# Patient Record
Sex: MALE | Race: OTHER | Employment: FULL TIME | URBAN - METROPOLITAN AREA
[De-identification: names, ages, dates, MRNs, and addresses within clinical notes are randomized per-mention and may not be internally consistent; named-entity substitution may affect disease eponyms.]

---

## 2022-04-27 ENCOUNTER — HOSPITAL ENCOUNTER (EMERGENCY)
Age: 22
Discharge: HOME | End: 2022-04-27
Attending: EMERGENCY MEDICINE

## 2022-04-27 VITALS
DIASTOLIC BLOOD PRESSURE: 78 MMHG | OXYGEN SATURATION: 98 % | HEIGHT: 63 IN | RESPIRATION RATE: 18 BRPM | TEMPERATURE: 97.9 F | HEART RATE: 66 BPM | SYSTOLIC BLOOD PRESSURE: 116 MMHG | BODY MASS INDEX: 23.92 KG/M2 | WEIGHT: 135 LBS

## 2022-04-27 DIAGNOSIS — H10.31 ACUTE CONJUNCTIVITIS OF RIGHT EYE, UNSPECIFIED ACUTE CONJUNCTIVITIS TYPE: ICD-10-CM

## 2022-04-27 DIAGNOSIS — S00.01XA ABRASION OF SCALP, INITIAL ENCOUNTER: Primary | ICD-10-CM

## 2022-04-27 PROCEDURE — 90715 TDAP VACCINE 7 YRS/> IM: CPT | Performed by: EMERGENCY MEDICINE

## 2022-04-27 PROCEDURE — 99284 EMERGENCY DEPT VISIT MOD MDM: CPT

## 2022-04-27 PROCEDURE — 90471 IMMUNIZATION ADMIN: CPT

## 2022-04-27 PROCEDURE — 74011250636 HC RX REV CODE- 250/636: Performed by: EMERGENCY MEDICINE

## 2022-04-27 RX ORDER — GENTAMICIN SULFATE 3 MG/ML
1 SOLUTION/ DROPS OPHTHALMIC 4 TIMES DAILY
Qty: 1 EACH | Refills: 0 | Status: SHIPPED | OUTPATIENT
Start: 2022-04-27 | End: 2022-05-02

## 2022-04-27 RX ADMIN — TETANUS TOXOID, REDUCED DIPHTHERIA TOXOID AND ACELLULAR PERTUSSIS VACCINE, ADSORBED 0.5 ML: 5; 2.5; 8; 8; 2.5 SUSPENSION INTRAMUSCULAR at 16:27

## 2022-04-27 NOTE — ED NOTES
I have reviewed discharge instructions with the patient. The patient verbalized understanding. Patient left ED via Discharge Method: ambulatory to Home with friend. Opportunity for questions and clarification provided. Patient given 1 scripts. To continue your aftercare when you leave the hospital, you may receive an automated call from our care team to check in on how you are doing. This is a free service and part of our promise to provide the best care and service to meet your aftercare needs.  If you have questions, or wish to unsubscribe from this service please call [phone]. Thank you for Choosing our [facility] Emergency Department.

## 2022-04-27 NOTE — ED TRIAGE NOTES
Pt states he was at work and lifted head up, scraping top of head on bottom of nail. Unsure of last tetanus shot.  needed, pt speaks Panamanian.

## 2022-04-27 NOTE — DISCHARGE INSTRUCTIONS
Apply antibiotic ointment to the wound 3 times a day  Wash your hair gently with mild soap and warm water  Do not apply alcohol or hydrogen peroxide to the cut    Use eyedrops as prescribed    Follow-up with ophthalmology  Follow-up with primary care    Return to ER for any worsening symptoms or new problems which may arise      Aplicar pomada antibiótica en la herida 3 veces al día. Lávese el joan suavemente con Ellouise Blade y agua tibia. No aplique alcohol o peróxido de hidrógeno en el naveed.     Use gotas para los ojos según lo prescrito  ______________________________________________________  [employer] con oftalmología  Seguimiento con atención primaria    Regrese a la emily de emergencias por cualquier empeoramiento de los síntomas o nuevos problemas que puedan surgir

## 2022-04-27 NOTE — ED PROVIDER NOTES
22-year-old male patient presents with a wound to his scalp. States that he stood up while painting and scratched the back of his head against an exposed nail  No other injuries  He has not been dizzy or lightheaded. He had no nausea vomiting or diarrhea    The history is provided by the patient. The history is limited by a language barrier. A  was used. Laceration   The incident occurred 1 to 2 hours ago. The laceration is located on the scalp. The laceration is 3 cm in size. The injury mechanism is a metal edge. Foreign body present: unknown. Possible foreign bodies present include a nail. The pain is mild. The pain has been constant since onset. Pertinent negatives include no numbness, no tingling and no weakness. It is unknown when the patient last had a tetanus shot. History reviewed. No pertinent past medical history. No past surgical history on file. History reviewed. No pertinent family history. Social History     Socioeconomic History    Marital status: Not on file     Spouse name: Not on file    Number of children: Not on file    Years of education: Not on file    Highest education level: Not on file   Occupational History    Not on file   Tobacco Use    Smoking status: Not on file    Smokeless tobacco: Not on file   Substance and Sexual Activity    Alcohol use: Not on file    Drug use: Not on file    Sexual activity: Not on file   Other Topics Concern    Not on file   Social History Narrative    Not on file     Social Determinants of Health     Financial Resource Strain:     Difficulty of Paying Living Expenses: Not on file   Food Insecurity:     Worried About Running Out of Food in the Last Year: Not on file    Nancy of Food in the Last Year: Not on file   Transportation Needs:     Lack of Transportation (Medical): Not on file    Lack of Transportation (Non-Medical):  Not on file   Physical Activity:     Days of Exercise per Week: Not on file  Minutes of Exercise per Session: Not on file   Stress:     Feeling of Stress : Not on file   Social Connections:     Frequency of Communication with Friends and Family: Not on file    Frequency of Social Gatherings with Friends and Family: Not on file    Attends Mu-ism Services: Not on file    Active Member of Clubs or Organizations: Not on file    Attends Club or Organization Meetings: Not on file    Marital Status: Not on file   Intimate Partner Violence:     Fear of Current or Ex-Partner: Not on file    Emotionally Abused: Not on file    Physically Abused: Not on file    Sexually Abused: Not on file   Housing Stability:     Unable to Pay for Housing in the Last Year: Not on file    Number of [place] in the Last Year: Not on file    Unstable Housing in the Last Year: Not on file         ALLERGIES: Patient has no known allergies. Review of Systems   Constitutional: Negative for activity change, chills, diaphoresis and fever. HENT: Negative for dental problem, hearing loss, nosebleeds, rhinorrhea and sore throat. Eyes: Positive for discharge and redness. Negative for pain and visual disturbance. Patient reports that he has had a problem with his right eye \"since birth. \"  States that when he gets hot it becomes red and tears  He is never been evaluated by medical provider for this complaint   Respiratory: Negative for cough, chest tightness and shortness of breath. Cardiovascular: Negative for chest pain, palpitations and leg swelling. Gastrointestinal: Negative for abdominal pain, constipation, diarrhea, nausea and vomiting. Endocrine: Negative for cold intolerance, heat intolerance, polydipsia and polyuria. Genitourinary: Negative for dysuria and flank pain. Musculoskeletal: Negative for arthralgias, back pain, joint swelling, myalgias and neck pain. Skin: Positive for wound. Negative for pallor and rash.    Allergic/Immunologic: Negative for environmental allergies and food allergies. Neurological: Negative for dizziness, tingling, tremors, weakness, light-headedness, numbness and headaches. Hematological: Negative for adenopathy. Does not bruise/bleed easily. Psychiatric/Behavioral: Negative for confusion and dysphoric mood. The patient is not nervous/anxious and is not hyperactive. All other systems reviewed and are negative. Vitals:    04/27/22 1619   BP: 116/78   Pulse: 66   Resp: 18   Temp: 97.9 °F (36.6 °C)   SpO2: 98%   Weight: 61.2 kg (135 lb)   Height: 5' 3\" (1.6 m)            Physical Exam  Vitals and nursing note reviewed. Constitutional:       General: He is in acute distress. Appearance: Normal appearance. He is well-developed and normal weight. HENT:      Head: Normocephalic. Right Ear: External ear normal.      Left Ear: External ear normal.      Mouth/Throat:      Mouth: Mucous membranes are moist.      Pharynx: Oropharynx is clear. No oropharyngeal exudate. Eyes:      General: Lids are normal. Vision grossly intact. Gaze aligned appropriately. No visual field deficit or scleral icterus. Extraocular Movements: Extraocular movements intact. Conjunctiva/sclera:      Right eye: Right conjunctiva is injected. No chemosis, exudate or hemorrhage. Pupils: Pupils are equal, round, and reactive to light. Neck:      Thyroid: No thyromegaly. Vascular: No JVD. Cardiovascular:      Rate and Rhythm: Normal rate and regular rhythm. Pulses: Normal pulses. Heart sounds: Normal heart sounds. No murmur heard. No friction rub. No gallop. Pulmonary:      Effort: Pulmonary effort is normal. No respiratory distress. Breath sounds: Normal breath sounds. No wheezing. Musculoskeletal:         General: No tenderness or deformity. Normal range of motion. Cervical back: Normal range of motion and neck supple. Skin:     General: Skin is warm and dry.       Capillary Refill: Capillary refill takes less than 2 seconds. Findings: No rash. Neurological:      General: No focal deficit present. Mental Status: He is alert and oriented to person, place, and time. Cranial Nerves: No cranial nerve deficit. Sensory: No sensory deficit. Motor: No abnormal muscle tone. Coordination: Coordination normal.   Psychiatric:         Mood and Affect: Mood normal.         Behavior: Behavior normal.         Thought Content: Thought content normal.         Judgment: Judgment normal.          MDM  Number of Diagnoses or Management Options  Abrasion of scalp, initial encounter: new and does not require workup  Acute conjunctivitis of right eye, unspecified acute conjunctivitis type: minor  Diagnosis management comments: 23-year-old male patient here with an abrasion secondary to an exposed nail  Wound will be cleaned and treated with Neosporin  No indication for more formal wound repair given the superficial nature of the injury    Will cover the patient's eye complaint with antibiotic drops and refer to ophthalmology. Not clear from his history if this is truly a chronic probably had since birth versus a more acute issue       Amount and/or Complexity of Data Reviewed  Tests in the medicine section of CPT®: reviewed and ordered  Decide to obtain previous medical records or to obtain history from someone other than the patient: yes  Review and summarize past medical records: yes    Risk of Complications, Morbidity, and/or Mortality  Presenting problems: low  Diagnostic procedures: minimal  Management options: low  General comments: Elements of this note have been dictated via voice recognition software. Text and phrases may be limited by the accuracy of the software. The chart has been reviewed, but errors may still be present.       Patient Progress  Patient progress: improved         Procedures